# Patient Record
Sex: FEMALE | Race: WHITE
[De-identification: names, ages, dates, MRNs, and addresses within clinical notes are randomized per-mention and may not be internally consistent; named-entity substitution may affect disease eponyms.]

---

## 2018-03-21 ENCOUNTER — HOSPITAL ENCOUNTER (INPATIENT)
Dept: HOSPITAL 68 - CBCO | Age: 28
LOS: 3 days | End: 2018-03-24
Attending: OBSTETRICS & GYNECOLOGY | Admitting: OBSTETRICS & GYNECOLOGY
Payer: COMMERCIAL

## 2018-03-21 VITALS — HEIGHT: 64 IN | WEIGHT: 148 LBS | BODY MASS INDEX: 25.27 KG/M2

## 2018-03-21 DIAGNOSIS — Z88.2: ICD-10-CM

## 2018-03-21 DIAGNOSIS — Z3A.39: ICD-10-CM

## 2018-03-21 PROCEDURE — G0463 HOSPITAL OUTPT CLINIC VISIT: HCPCS

## 2018-03-22 VITALS — DIASTOLIC BLOOD PRESSURE: 77 MMHG | SYSTOLIC BLOOD PRESSURE: 122 MMHG

## 2018-03-22 LAB
ABSOLUTE GRANULOCYTE CT: 6.9 /CUMM (ref 1.4–6.5)
BASOPHILS # BLD: 0 /CUMM (ref 0–0.2)
BASOPHILS NFR BLD: 0.1 % (ref 0–2)
EOSINOPHIL # BLD: 0.1 /CUMM (ref 0–0.7)
EOSINOPHIL NFR BLD: 0.6 % (ref 0–5)
ERYTHROCYTE [DISTWIDTH] IN BLOOD BY AUTOMATED COUNT: 13.5 % (ref 11.5–14.5)
GRANULOCYTES NFR BLD: 74 % (ref 42.2–75.2)
HCT VFR BLD CALC: 35 % (ref 37–47)
LYMPHOCYTES # BLD: 1.8 /CUMM (ref 1.2–3.4)
MCH RBC QN AUTO: 28.8 PG (ref 27–31)
MCHC RBC AUTO-ENTMCNC: 34.4 G/DL (ref 33–37)
MCV RBC AUTO: 83.8 FL (ref 81–99)
MONOCYTES # BLD: 0.6 /CUMM (ref 0.1–0.6)
PLATELET # BLD: 206 /CUMM (ref 130–400)
PMV BLD AUTO: 9.6 FL (ref 7.4–10.4)
RED BLOOD CELL CT: 4.17 /CUMM (ref 4.2–5.4)
WBC # BLD AUTO: 9.4 /CUMM (ref 4.8–10.8)

## 2018-03-22 PROCEDURE — 3E033VJ INTRODUCTION OF OTHER HORMONE INTO PERIPHERAL VEIN, PERCUTANEOUS APPROACH: ICD-10-PCS | Performed by: OBSTETRICS & GYNECOLOGY

## 2018-03-22 NOTE — HISTORY & PHYSICAL
General Information and HPI
MD Statement:
I have seen and personally examined OBED HERNÁNDEZ and documented this H&P.
 
The patient is a 27 year old female at [39] weeks and [2] days gestation who 
presented with a chief complaint of [LOF].
 
Source of Information: patient
Exam Limitations: no limitations
History of Present Illness:
26yo,  39 2/7wks , c/o  ? LOF for several day, since  yesterday 6pm, sh 
enoticed increasing amout of LOF, she needs to wear pads, amnisure positive. she
denies ctxs, no VB, reports GFM.
Prenatal care started at 8 2/7wks. uncomplicated thus far, Rh negtaive, she 
received Rho MARK at 28 wks. GBS negative
 
Allergies/Medications
Allergies:
Coded Allergies:
Sulfa (Sulfonamide Antibiotics) (Intermediate, HIVES 18)
 
Compliance With Home Meds: GOOD
 
Past History
 
OB/Gyn History
: 1
Para: 0
Last Menstrual Period:
2017
Estimated Delivery Date:
3/27/2018
Past OB/Gyn History: none
 
Medical History
Blood Transfusion Hx: No
Neurological: NONE
EENT: NONE
Cardiovascular: NONE
Respiratory: NONE
Gastrointestinal: NONE
Hepatic: NONE
Renal: NONE
Musculoskeletal: NONE
Psychiatric: NONE
Endocrine: NONE
Blood Disorders: NONE
Cancer(s): NONE
GYN/Reproductive: NONE
 
Surgical History
Pertinent Surgical History: appendectomy, eye surgery x 4, last 
 
Past Family/Social History
 
Psychosocial History
Smoking Status: Current Some Day Smoker
ETOH Use: denies use
Illicit Drug Use: denies illicit drug use
 
Review of Systems
 
Review of Systems
Constitutional:
Reports: no symptoms. 
EENTM:
Reports: no symptoms. 
Cardiovascular:
Reports: no symptoms. 
Respiratory:
Reports: no symptoms. 
GI:
Reports: no symptoms. 
Genitourinary:
Reports: no symptoms. 
Musculoskeletal:
Reports: no symptoms. 
Skin:
Reports: no symptoms. 
Neurological/Psychological:
Reports: no symptoms. 
Hematologic/Endocrine:
Reports: no symptoms. 
Immunologic/Allergic:
Reports: no symptoms. 
All Other Systems: Reviewed and Negative
 
Exam & Diagnostic Data
Last 24 Hrs of Vital Signs/I&O
 Vital Signs
 
 
Date Time Temp Pulse Resp B/P B/P Pulse O2 O2 Flow FiO2
 
     Mean Ox Delivery Rate 
 
 0247    122/77     
 
 
 Intake & Output
 
 
  1600  0800  0000
 
Intake Total   
 
Output Total   
 
Balance   
 
    
 
Patient  67.132 kg 
 
Weight   
 
 
 
 
Obstetric Exam
Wgt Gained During Pregnancy:
30lbs
Pelvimetry:
adequate
Dilation (cm): 0
Effacement (%): 40
Station: -3
Membranes: SROM
Fluid: clear
Fundal Height (cm): 37
Multiple Gestation? No
Contractions:
occasional
Infant #1 -
   FHR Baseline: 140
   Category: 1
   Estimated Fetal Weight:
3000g
   Fetal Presentation:
vertex
Patient for Induction? Yes
Parrish Score
 
 
Parrish Score Response Value
 
Cervix Position: posterior 0
 
Cervix Consistency: firm 0
 
Cervix Effacement: 30-50% 1
 
Cervix Dilation: closed 0
 
Cervix Station: -3 0
 
Total   1
 
 
Physical Exam:
VSS
General: NAD
Abdomen: gravid , soft, nontender
Ext: DCT (-)
 
Prenatal Labs
Blood Type & Rh:
O negative
Antibody Screen:
negative
Hct/Hgb & Platelets #1:
12.5/37.1%,PLT [id number]
Hct/Hgb & Platelets #2:
12.1/35%, plt [id number]
Rubella:
immune
VDRL #1:
negative
VDRL #2:
negative
HbsAg:
negative
HIV #1:
negative
HIV #2
negative
1 Hr P
Group B Strep:
negative
Initial Ultrasound:
IUP at 8 2/7wks
Anatomy Ultrasound:
nl
Genetic Testing:
nl
Last 24 Hrs of Labs/Yair:
 Laboratory Tests
 
18 2340:
CBC w Diff NO MAN DIFF REQ, RBC 4.17  L, MCV 83.8, MCH 28.8, MCHC 34.4, RDW 13.5
, MPV 9.6, Gran % 74.0, Lymphocytes % 19.2  L, Monocytes % 6.1, Eosinophils % 
0.6, Basophils % 0.1, Absolute Granulocytes 6.9  H, Absolute Lymphocytes 1.8, 
Absolute Monocytes 0.6, Absolute Eosinophils 0.1, Absolute Basophils 0, 
Urinalysis LIGHT  H, Urine Color YEL, Urine Clarity HAZY  H, Urine pH 6.0, Ur 
Specific Gravity 1.025, Urine Protein TRACE  H, Urine Ketones TRACE  H, Urine 
Nitrite NEG, Urine Bilirubin NEG, Urine Urobilinogen 0.2, Ur Leukocyte Esterase 
NEG, Ur Microscopic SEDIMENT EXAMINED, Urine RBC 1-3, Urine WBC 3-5  H, Ur 
Epithelial Cells FEW, Urine Bacteria MOD  H, Urine Mucus MOD  H, Urine 
Hemoglobin NEG, Urine Glucose NEG
 
18 2300:
Fetal Membrane Rupture POSITIVE
 
 
Assessment/Plan
Assessment/Plan:
26yo,  39 2/7wks SROM, ? prolong ROM
1. admit pt, admission labs
2. due to ? prolong ROM, Risks of prolong ROM d/w pt , she understand, will 
start pitocin for IOL, she understand and agreed. 
3. will monitor closely
 
As Ranked By This Provider
Problem List:
 1. Pregnancy
 
 2. SROM (spontaneous rupture of membranes)
 
 
Core Measures
 
Venous Thromboembolism
VTE Risk Factors Pregnancy/Postpartum
No Mechanical VTE Prophylaxis d/t LowRisk-No Interven Req'd
No VTE Pharm Prophylaxis d/t LowRisk-No Interven Req'd
 
Attending MD Review Statement
 
Attending Statement
Attending MD Statement: examined this patient, discussed with family, discussed 
w/nursing

## 2018-03-22 NOTE — PN- OBGYN
Surgical Brief Attending Note
Brief Attending Note:
LATE ENTRY FOR 1PM
pt c/o ctxs pain, does not request pain management.
on TOCO: ctxs q 1.5-3 min, FHR cat.I
VE defered
will continue currwent management, monitor closely

## 2018-03-22 NOTE — PN- OBGYN
Surgical Brief Attending Note
Brief Attending Note:
pt c/o  increasing ctxs pain, request epidural
on TOCO: ctxs q 2-3 min, FHR cat I
VE: defered
anesthesia informed for epidural. will continue current pitocin for IOL

## 2018-03-22 NOTE — PN- OBGYN
Surgical Brief Attending Note
Brief Attending Note:
Called by RN around 3:30 PM, stated patient demanded to stop Pitocin for 
induction of labor due to painful contractions, requests  for delivered
a baby.  Patient was offered for pain management, but she declined.  Pitocin was
turned off.
Discussed with patient about risks of elective  section in detail, 
including but not limited to bleeding ,infection, damage to surrounding organs, 
complications with anesthesia, risks in subsequent pregnancies, secondary 
healing of incision, longer hospital stay,etc, she understand.  Also discussed 
with patient again about risks/ benefits of induction of labor, she understand. 
Offered patient epidural for pain management during Pitocin induction, she 
understand and discussed with FOB, she changed her mind for elective  ,
willing to restart Pitocin induction, epidural upon patient's request, all 
questions answered.  Will restart Pitocin for induction of labor, monitor 
closely.

## 2018-03-23 PROCEDURE — 0KQM0ZZ REPAIR PERINEUM MUSCLE, OPEN APPROACH: ICD-10-PCS | Performed by: OBSTETRICS & GYNECOLOGY

## 2018-03-23 NOTE — LABOR & DELIVERY SUMMARY
Delivery Summary
Vaginal Delivery:
   Vaginal: vertex
Episiotomy/Lacerations:
   Episiotomy/Lacerations: 2ND DEGREE, SUPERFICAIL LABIAL B/L
   Type:
2ND DEGREE, SUPERFICIAL LABIAL B/L
   Repair:
2-3 VICRYL, 3-0 VICRYL
   Anesthesia:
EPIDURAL
Placenta:
   Placenta: spontanteous, normal, 3 vessel
Anesthesia: EPIDURAL
Baby's Weight:
[id number]
Apgars - 1 Min: 9
Apgars - 5 Min: 9
Additional Comments:
Patient fully dilated, pushed well, spontaneously delivered a viable male infant
at cephalic presentation, WENDY position, head delivered atraumatically, followed 
by shoulder and rest of the body without difficulty, baby vigorous and cried, 
place and mother's chest, nose and mouth suctioned with suction bulb, cord 
clamped and cut.  Placenta delivered spontaneously, intact, three-vessel cord, 
after delivered the placenta, heavy bleeding encountered, uterus massaged to 
firm, Pitocin bolus, Methergine 0.2 mg IM 1 dose, heavy breathing resolved.  
Second-degree laceration repair with 2-0 Vicryl.  Superficial labial laceration 
repaired with 3-0 Vicryl.   mL.  Laps and instruments counts were 
correct.Patient and baby in recovery room in stable condition.

## 2018-03-24 LAB
ABSOLUTE GRANULOCYTE CT: 6.8 /CUMM (ref 1.4–6.5)
BASOPHILS # BLD: 0 /CUMM (ref 0–0.2)
BASOPHILS NFR BLD: 0.4 % (ref 0–2)
EOSINOPHIL # BLD: 0 /CUMM (ref 0–0.7)
EOSINOPHIL NFR BLD: 0.5 % (ref 0–5)
ERYTHROCYTE [DISTWIDTH] IN BLOOD BY AUTOMATED COUNT: 13.9 % (ref 11.5–14.5)
GRANULOCYTES NFR BLD: 73 % (ref 42.2–75.2)
HCT VFR BLD CALC: 28.3 % (ref 37–47)
LYMPHOCYTES # BLD: 1.9 /CUMM (ref 1.2–3.4)
MCH RBC QN AUTO: 29.2 PG (ref 27–31)
MCHC RBC AUTO-ENTMCNC: 33.9 G/DL (ref 33–37)
MCV RBC AUTO: 86.1 FL (ref 81–99)
MONOCYTES # BLD: 0.6 /CUMM (ref 0.1–0.6)
PLATELET # BLD: 155 /CUMM (ref 130–400)
PMV BLD AUTO: 9.8 FL (ref 7.4–10.4)
RED BLOOD CELL CT: 3.28 /CUMM (ref 4.2–5.4)
WBC # BLD AUTO: 9.3 /CUMM (ref 4.8–10.8)

## 2018-03-24 NOTE — PN- POST DELIVERY/GYN
Subjective
Subjective:
feeling well
 
Review of Systems
Constitutional:
Reports: no symptoms.  Denies: chills, fever. 
EENTM:
Denies: blurred vision, double vision. 
Cardiovascular:
Denies: chest pain, edema. 
Neurological/Psychological:
Denies: anxiety, depressed. 
 
Objective
Last 24 Hrs of Vital Signs/I&O
vss
 
Physical Exam
General Appearance Alert, Oriented X3, Cooperative, No Acute Distress
Abdomen Soft, No Tenderness, fundus firm
Extremities No Edema
Pelvic (FEMALE) lochia serosanganous
 
Assessment/Plan
Assessment/Plan
ppd #1 vss afebrile
Problem List:
 1. SROM (spontaneous rupture of membranes)
 
 
Attending MD Review Statement
 
Attending Statement
Attending MD Statement: examined this patient, discussed with family, discussed 
with nursing